# Patient Record
Sex: FEMALE | Race: WHITE | NOT HISPANIC OR LATINO | Employment: PART TIME | URBAN - METROPOLITAN AREA
[De-identification: names, ages, dates, MRNs, and addresses within clinical notes are randomized per-mention and may not be internally consistent; named-entity substitution may affect disease eponyms.]

---

## 2021-08-29 ENCOUNTER — HOSPITAL ENCOUNTER (EMERGENCY)
Facility: CLINIC | Age: 41
Discharge: HOME | End: 2021-08-29
Attending: STUDENT IN AN ORGANIZED HEALTH CARE EDUCATION/TRAINING PROGRAM | Admitting: STUDENT IN AN ORGANIZED HEALTH CARE EDUCATION/TRAINING PROGRAM
Payer: OTHER MISCELLANEOUS

## 2021-08-29 VITALS
OXYGEN SATURATION: 98 % | DIASTOLIC BLOOD PRESSURE: 78 MMHG | TEMPERATURE: 98.1 F | RESPIRATION RATE: 20 BRPM | HEART RATE: 72 BPM | BODY MASS INDEX: 34.15 KG/M2 | SYSTOLIC BLOOD PRESSURE: 133 MMHG | HEIGHT: 64 IN | WEIGHT: 200 LBS

## 2021-08-29 DIAGNOSIS — S05.92XA BILATERAL EYE INJURIES, INITIAL ENCOUNTER: ICD-10-CM

## 2021-08-29 DIAGNOSIS — S05.91XA BILATERAL EYE INJURIES, INITIAL ENCOUNTER: ICD-10-CM

## 2021-08-29 PROCEDURE — 99282 EMERGENCY DEPT VISIT SF MDM: CPT

## 2021-08-29 RX ORDER — POLYMYXIN B SULFATE AND TRIMETHOPRIM 1; 10000 MG/ML; [USP'U]/ML
1-2 SOLUTION OPHTHALMIC EVERY 4 HOURS
Qty: 3 ML | Refills: 0 | Status: SHIPPED | OUTPATIENT
Start: 2021-08-29 | End: 2021-09-03

## 2021-08-29 ASSESSMENT — MIFFLIN-ST. JEOR: SCORE: 1547.19

## 2021-08-30 NOTE — ED PROVIDER NOTES
"  Emergency Department Encounter         FINAL IMPRESSION:  Eye injury        ED COURSE AND MEDICAL DECISION MAKING       ED Course as of Aug 29 2219   Sun Aug 29, 2021   2217 Patient is a healthy 41-year-old here with eye injury.  States she was at work when she got  splashed in both eyes.  States she washed it vigorously for 10 minutes.  States she feels well however her work \"made me come here.\"  On arrival she states her left eye feels mildly dry however no pain or vision issues.  No fevers.  No chills.  Did not inhale the solution.  The left eye has very subtle scleral injection however no other significant signs of irritation.  No tearing.  Right eye normal.  Vision normal.  pH is normal bilaterally.  Plan for discharge home with Polytrim and outpatient follow-up as needed.            9:57 PM I met with the patient, obtained history, performed an initial exam, and discussed options and plan for diagnostics and treatment here in the ED.   10:13 PM I checked the pH of her eyes. Bilaterally ph 7.          At the conclusion of the encounter I discussed the results of all the tests and the disposition. The questions were answered. The patient or family acknowledged understanding and was agreeable with the care plan.                  MEDICATIONS GIVEN IN THE EMERGENCY DEPARTMENT:  Medications - No data to display    NEW PRESCRIPTIONS STARTED AT TODAY'S ED VISIT:  New Prescriptions    No medications on file       HPI     Patient information obtained from: Patient    Use of Interpretor: N/A    Anitha Preston is a 41 year old female with no pertinent history who presents to this ED by car for evaluation of an eye problem.    Patient reports that she was at work using \"rotisery oven \" and some sprayed into her eyes bilaterally, but mostly her left eye. She states her left eye feels dry. Vision is unchanged. Patient reports she flushed out her eyes with a saline solution and tap water. She denies " "inhalation. Patient denies any other current complaints.      REVIEW OF SYSTEMS:  Review of Systems   Constitutional: Negative for fever, malaise  HEENT: Negative runny nose, sore throat, ear pain, neck pain  Respiratory: Negative for shortness of breath, cough, congestion  Cardiovascular: Negative for chest pain, leg edema  Gastrointestinal: Negative for abdominal distention, abdominal pain, constipation, vomiting, nausea, diarrhea  Genitourinary: Negative for dysuria and hematuria.   Integument: Negative for rash, skin breakdown  Neurological: Negative for paresthesias, weakness, headache.  Musculoskeletal: Negative for joint pain, joint swelling      All other systems reviewed and are negative.          MEDICAL HISTORY     History reviewed. No pertinent past medical history.    History reviewed. No pertinent surgical history.    Social History     Tobacco Use     Smoking status: None   Substance Use Topics     Alcohol use: None     Drug use: None       No current outpatient medications on file.          PHYSICAL EXAM     /78   Pulse 72   Temp 98.1  F (36.7  C) (Oral)   Resp 20   Ht 1.626 m (5' 4\")   Wt 90.7 kg (200 lb)   LMP 08/22/2021   SpO2 98%   Breastfeeding No   BMI 34.33 kg/m        PHYSICAL EXAM:     General: Patient appears well, nontoxic, comfortable  HEENT: Moist mucous membranes, no tongue swelling.  No head trauma.  No midline neck pain.  Subtle left scleral injection.  Forage motion of the eyes bilaterally.  Vision normal.  No signs of cellulitis.  Neurological: Alert and oriented, grossly neurologically intact.  Psychological: Normal affect and mood.  Integument: No rashes appreciated          RESULTS       Labs Ordered and Resulted from Time of ED Arrival Up to the Time of Departure from the ED - No data to display    No orders to display                     PROCEDURES:  Procedures:  Marilyn Bonilla am serving as a scribe to document services personally performed by " Stanislav Perez DO, based on my observations and the provider's statements to me.  I, Stanislav Perez DO, attest that Marilyn Khan is acting in a scribe capacity, has observed my performance of the services and has documented them in accordance with my direction.    Stanislav Perez DO  Emergency Medicine  [facility] EMERGENCY ROOM     Stanislav Perez DO  08/29/21 1540

## 2021-08-30 NOTE — DISCHARGE INSTRUCTIONS
Please take eyedrops as needed.    Follow-up with the eye doctor listed if your pain is persisting or vision gets worse over the next couple days.

## 2021-08-30 NOTE — ED TRIAGE NOTES
"Pt to the ED on her own with c/o splashing \"[employer]ven \" - \"[employer]\" at about 2100 tonight. Pt called the company and they advised her to come the ED. Pt flushed her eyes with saline solution and tap water - for about 10 minutes. Pt denies vision problems, no redness or swelling noted.   "